# Patient Record
Sex: MALE | Race: WHITE | NOT HISPANIC OR LATINO | Employment: OTHER | URBAN - METROPOLITAN AREA
[De-identification: names, ages, dates, MRNs, and addresses within clinical notes are randomized per-mention and may not be internally consistent; named-entity substitution may affect disease eponyms.]

---

## 2017-06-12 RX ORDER — IBUPROFEN 200 MG
200 TABLET ORAL EVERY 6 HOURS PRN
COMMUNITY

## 2017-06-26 ENCOUNTER — OFFICE VISIT (OUTPATIENT)
Dept: ORTHOPEDIC SURGERY | Facility: CLINIC | Age: 52
End: 2017-06-26

## 2017-06-26 VITALS
HEART RATE: 87 BPM | WEIGHT: 194 LBS | SYSTOLIC BLOOD PRESSURE: 140 MMHG | DIASTOLIC BLOOD PRESSURE: 86 MMHG | HEIGHT: 67 IN | BODY MASS INDEX: 30.45 KG/M2

## 2017-06-26 DIAGNOSIS — M19.171 POST-TRAUMATIC ARTHRITIS OF RIGHT ANKLE: Primary | ICD-10-CM

## 2017-06-26 PROCEDURE — 99213 OFFICE O/P EST LOW 20 MIN: CPT | Performed by: ORTHOPAEDIC SURGERY

## 2017-06-26 NOTE — PROGRESS NOTES
NEW PATIENT    Patient: Anthony Laery  : 1965    Primary Care Provider: Tamanna Young MD    Requesting Provider: As above    Follow-up of the Right Foot (2010 ORIF [facility] TypeIV-C  Calcaneus fx)      History    Chief Complaint: Right calcaneus fracture    History of Present Illness: He is now 7 years out from right calcaneus fracture.  He still uses a cane intermittently, has good days and bad days.  Is not entirely certain he is ready for fusion, but he has been thinking about it.  He has a new grandson.    Current Outpatient Prescriptions on File Prior to Visit   Medication Sig Dispense Refill   • ibuprofen (ADVIL,MOTRIN) 200 MG tablet Take 200 mg by mouth Every 6 (Six) Hours As Needed for Mild Pain (1-3).       No current facility-administered medications on file prior to visit.       No Known Allergies   Past Medical History:   Diagnosis Date   • Traumatic arthritis of ankle     Ankle/Foot     Past Surgical History:   Procedure Laterality Date   • FOOT SURGERY Right    • FRACTURE SURGERY       Family History   Problem Relation Age of Onset   • Hypertension Mother    • Cancer Father       Social History     Social History   • Marital status:      Spouse name: N/A   • Number of children: N/A   • Years of education: N/A     Occupational History   • Not on file.     Social History Main Topics   • Smoking status: Former Smoker     Packs/day: 1.00   • Smokeless tobacco: Current User     Types: Chew   • Alcohol use Yes      Comment: 2 daily   • Drug use: No   • Sexual activity: Defer     Other Topics Concern   • Not on file     Social History Narrative        Review of Systems   Constitutional: Negative.    HENT: Negative.    Eyes: Positive for itching.   Respiratory: Negative.    Cardiovascular: Negative.    Gastrointestinal: Negative.    Endocrine: Negative.    Genitourinary: Negative.    Musculoskeletal: Positive for joint swelling.   Skin: Negative.    Allergic/Immunologic: Positive for  "environmental allergies.   Neurological: Negative.    Hematological: Negative.    Psychiatric/Behavioral: Negative.        The following portions of the patient's history were reviewed and updated as appropriate: allergies, current medications, past family history, past medical history, past social history, past surgical history and problem list.    Physical Exam:   /86  Pulse 87  Ht 67\" (170.2 cm)  Wt 194 lb (88 kg)  BMI 30.38 kg/m2  GENERAL: Body habitus: overweight    Lower extremity edema: Left: trace; Right: trace    Varicose veins:  Left: mild; Right: mild    Gait: antalgic and using cane     Mental Status:  awake and alert; oriented to person, place, and time    Voice:  clear  SKIN:  Normal    Hair Growth:  Right:normal; Left:  normal  NAILS: Toenails: thick  HEENT: Head: Normocephalic, atraumatic,  without obvious abnormality.  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  We again discussed the pros and cons of subtalar fusion.  He has not entirely certain he is ready for this.  I will see him again in a year or sooner with any problems                  "

## 2018-06-25 ENCOUNTER — OFFICE VISIT (OUTPATIENT)
Dept: ORTHOPEDIC SURGERY | Facility: CLINIC | Age: 53
End: 2018-06-25

## 2018-06-25 VITALS — HEART RATE: 62 BPM | BODY MASS INDEX: 28.57 KG/M2 | HEIGHT: 68 IN | WEIGHT: 188.49 LBS | OXYGEN SATURATION: 96 %

## 2018-06-25 DIAGNOSIS — M79.671 RIGHT FOOT PAIN: Primary | ICD-10-CM

## 2018-06-25 DIAGNOSIS — M19.171 POST-TRAUMATIC ARTHRITIS OF RIGHT ANKLE: ICD-10-CM

## 2018-06-25 PROCEDURE — 99213 OFFICE O/P EST LOW 20 MIN: CPT | Performed by: ORTHOPAEDIC SURGERY

## 2018-06-25 NOTE — PROGRESS NOTES
ESTABLISHED PATIENT    Patient: Anthony Leary  : 1965    Primary Care Provider: Tamanna Young MD    Requesting Provider: As above    Follow-up of the Right Foot (1 year f/u/Post-traumatic arthritis of right ankle)      History    Chief Complaint: right subtalar pain    History of Present Illness: he is now 8 years s/p ORIF right calcaneus.  He reports he might think about having a subtalar fusion this year.  His father is having chemotherapy and he wants to see how he does.  They have several more grandchildren now, his wife had a hip replacement.      Current Outpatient Prescriptions on File Prior to Visit   Medication Sig Dispense Refill   • ibuprofen (ADVIL,MOTRIN) 200 MG tablet Take 200 mg by mouth Every 6 (Six) Hours As Needed for Mild Pain (1-3).       No current facility-administered medications on file prior to visit.       No Known Allergies   Past Medical History:   Diagnosis Date   • Traumatic arthritis of ankle     Ankle/Foot     Past Surgical History:   Procedure Laterality Date   • FOOT SURGERY Right    • FRACTURE SURGERY       Family History   Problem Relation Age of Onset   • Hypertension Mother    • Cancer Father       Social History     Social History   • Marital status:      Spouse name: N/A   • Number of children: N/A   • Years of education: N/A     Occupational History   • Not on file.     Social History Main Topics   • Smoking status: Former Smoker     Packs/day: 1.00   • Smokeless tobacco: Current User     Types: Chew   • Alcohol use Yes      Comment: 2 daily   • Drug use: No   • Sexual activity: Defer     Other Topics Concern   • Not on file     Social History Narrative   • No narrative on file        Review of Systems    The following portions of the patient's history were reviewed and updated as appropriate: allergies, current medications, past family history, past medical history, past social history, past surgical history and problem list.    Physical Exam:   Pulse 62    "Ht 172.7 cm (68\")   Wt 85.5 kg (188 lb 7.9 oz)   SpO2 96%   BMI 28.66 kg/m²   GENERAL: Body habitus: overweight    Lower extremity edema: Left: trace; Right: trace    Varicose veins:  Left: mild; Right: mild    Gait: using cane     Mental Status:  awake and alert; oriented to person, place, and time    Voice:  clear    CV:  Dorsalis Pedis:  Right:  2+; Left:  2+    Posterior Tibial: Right:  2+; Left:  2+    Capillary Refill:  Brisk  MSK:      Foot:  Right:  tender over subtalar joint, stiff, old incision healed; Left:  non tender      Medical Decision Making    Data Review:   ordered and reviewed x-rays today    Assessment/Plan/Diagnosis/Treatment Options:   1.Post-traumatic arthritis of right subtalar joint  His post traumatic arthritis pain is getting worse.  We discussed fusion.  We will make his follow up for one year.  If he decides he wants to have surgery before that he will call and come in for an appointment to discuss again.  Xray of calcaneus next visit.                       "

## 2019-06-24 ENCOUNTER — OFFICE VISIT (OUTPATIENT)
Dept: ORTHOPEDIC SURGERY | Facility: CLINIC | Age: 54
End: 2019-06-24

## 2019-06-24 VITALS — HEART RATE: 96 BPM | WEIGHT: 188.49 LBS | HEIGHT: 68 IN | OXYGEN SATURATION: 98 % | BODY MASS INDEX: 28.57 KG/M2

## 2019-06-24 DIAGNOSIS — M19.171 POST-TRAUMATIC ARTHRITIS OF RIGHT ANKLE: Primary | ICD-10-CM

## 2019-06-24 PROCEDURE — 99212 OFFICE O/P EST SF 10 MIN: CPT | Performed by: ORTHOPAEDIC SURGERY

## 2019-06-24 NOTE — PROGRESS NOTES
"ESTABLISHED PATIENT    Patient: Anthony Leary  : 1965    Primary Care Provider: Tamanna Young MD    Requesting Provider: As above    Follow-up of the Right Foot (1 year - Post-traumatic arthritis of right subtalar joint - Discuss SX options )      History    Chief Complaint: Right subtalar joint posttraumatic arthritis    History of Present Illness: He returns for follow-up of his right subtalar posttraumatic arthritis.  He reports that he is \"getting close\" to thinking about surgery.  What I would do surgically is fusing the subtalar joint, and repairing the peroneal tendon subluxation and deepening the fibular groove.  He still using a cane.  He reports significant pain daily.  He is now 9 years post fracture open reduction internal fixation.  His wife is with him today.  Together they are taking care of 5 grandchildren.    Current Outpatient Medications on File Prior to Visit   Medication Sig Dispense Refill   • ibuprofen (ADVIL,MOTRIN) 200 MG tablet Take 200 mg by mouth Every 6 (Six) Hours As Needed for Mild Pain (1-3).       No current facility-administered medications on file prior to visit.       No Known Allergies   Past Medical History:   Diagnosis Date   • Traumatic arthritis of ankle     Ankle/Foot     Past Surgical History:   Procedure Laterality Date   • FOOT SURGERY Right    • FRACTURE SURGERY       Family History   Problem Relation Age of Onset   • Hypertension Mother    • Cancer Father       Social History     Socioeconomic History   • Marital status:      Spouse name: Not on file   • Number of children: Not on file   • Years of education: Not on file   • Highest education level: Not on file   Tobacco Use   • Smoking status: Former Smoker     Packs/day: 1.00   • Smokeless tobacco: Current User     Types: Chew   Substance and Sexual Activity   • Alcohol use: Yes     Comment: 2 daily   • Drug use: No   • Sexual activity: Defer        Review of Systems   Constitutional: Negative.  " "  HENT: Positive for sneezing.    Eyes: Positive for redness and itching.   Respiratory: Negative.    Cardiovascular: Positive for leg swelling.   Gastrointestinal: Negative.    Endocrine: Negative.    Genitourinary: Negative.    Musculoskeletal: Positive for arthralgias and joint swelling.   Skin: Negative.    Allergic/Immunologic: Negative.    Neurological: Negative.    Hematological: Negative.    Psychiatric/Behavioral: Negative.        The following portions of the patient's history were reviewed and updated as appropriate: allergies, current medications, past family history, past medical history, past social history, past surgical history and problem list.    Physical Exam:   Pulse 96   Ht 172.7 cm (68\")   Wt 85.5 kg (188 lb 7.9 oz)   SpO2 98%   BMI 28.66 kg/m²   GENERAL: Body habitus: overweight    Lower extremity edema: Left: trace; Right: trace    Gait: antalgic and using cane     Mental Status:  awake and alert; oriented to person, place, and time  MSK:  Tibia:  Right:  non tender; Left:  non tender        Ankle:  Right: Tender over the peroneal tendons which are subluxed over the fibula; Left:  non tender        Foot:  Right:  Very tender over the subtalar joint which is stiff, old incision is healed; Left:  non tender    NEURO Sensation:  intact     Medical Decision Making    Data Review:   ordered and reviewed x-rays today    Assessment/Plan/Diagnosis/Treatment Options:   1. Post-traumatic arthritis of right ankle  He has posttraumatic arthritis of the right subtalar joint.  He is also developed subluxation of the peroneal tendons over the years.  We again discussed treatment.  I will see him back in October.  Standing 2 views of the foot and AP of the ankle.  - XR Calcaneus 2+ View Right                            "

## 2019-10-07 ENCOUNTER — OFFICE VISIT (OUTPATIENT)
Dept: ORTHOPEDIC SURGERY | Facility: CLINIC | Age: 54
End: 2019-10-07

## 2019-10-07 VITALS — HEART RATE: 108 BPM | OXYGEN SATURATION: 98 % | WEIGHT: 187.83 LBS | BODY MASS INDEX: 28.47 KG/M2 | HEIGHT: 68 IN

## 2019-10-07 DIAGNOSIS — M19.171 POST-TRAUMATIC ARTHRITIS OF RIGHT ANKLE: Primary | ICD-10-CM

## 2019-10-07 PROCEDURE — 99214 OFFICE O/P EST MOD 30 MIN: CPT | Performed by: ORTHOPAEDIC SURGERY

## 2019-10-07 NOTE — PROGRESS NOTES
ESTABLISHED PATIENT    Patient: Anthony Leary  : 1965    Primary Care Provider: Tamanna Young MD    Requesting Provider: As above    Follow-up (Post-traumatic arthritis of right ankle)      History    Chief Complaint: Right hindfoot pain    History of Present Illness: He returns for follow-up of his right subtalar posttraumatic arthritis with peroneal dislocation.  He would like to proceed with surgery in May.  I went over everything in detail with the patient and his wife.    Current Outpatient Medications on File Prior to Visit   Medication Sig Dispense Refill   • ibuprofen (ADVIL,MOTRIN) 200 MG tablet Take 200 mg by mouth Every 6 (Six) Hours As Needed for Mild Pain (1-3).       No current facility-administered medications on file prior to visit.       No Known Allergies   Past Medical History:   Diagnosis Date   • Traumatic arthritis of ankle     Ankle/Foot     Past Surgical History:   Procedure Laterality Date   • FOOT SURGERY Right    • FRACTURE SURGERY       Family History   Problem Relation Age of Onset   • Hypertension Mother    • Cancer Father       Social History     Socioeconomic History   • Marital status:      Spouse name: Not on file   • Number of children: Not on file   • Years of education: Not on file   • Highest education level: Not on file   Tobacco Use   • Smoking status: Former Smoker     Packs/day: 1.00   • Smokeless tobacco: Current User     Types: Chew   Substance and Sexual Activity   • Alcohol use: Yes     Comment: 2 daily   • Drug use: No   • Sexual activity: Defer        Review of Systems   Constitutional: Negative.    HENT: Negative.    Eyes: Negative.    Respiratory: Negative.    Cardiovascular: Negative.    Gastrointestinal: Negative.    Endocrine: Negative.    Genitourinary: Negative.    Musculoskeletal: Positive for joint swelling.   Skin: Negative.    Allergic/Immunologic: Negative.    Neurological: Negative.    Hematological: Negative.    Psychiatric/Behavioral:  "Negative.        The following portions of the patient's history were reviewed and updated as appropriate: allergies, current medications, past family history, past medical history, past social history, past surgical history and problem list.    Physical Exam:   Pulse 108   Ht 172.7 cm (67.99\")   Wt 85.2 kg (187 lb 13.3 oz)   SpO2 98%   BMI 28.57 kg/m²   GENERAL: Body habitus: normal weight for height    Lower extremity edema: Left: trace; Right: trace    Gait: using cane     Mental Status:  awake and alert; oriented to person, place, and time  MSK:  Tibia:  Right:  non tender; Left:  non tender        Ankle:  Right: Tender over the peroneals, they are subluxed over the fibula; Left:  non tender        Foot:  Right:  Very tender over the subtalar joint, stiff; Left:  non tender    NEURO Sensation:  intact    Medical Decision Making    Data Review:   ordered and reviewed x-rays today    Assessment/Plan/Diagnosis/Treatment Options:   1. Post-traumatic arthritis of right ankle  He has subtalar arthritis and dislocation of the peroneal tendons.  He has a little bit of irregularity of the calcaneocuboid joint but is not symptomatic there.  We discussed subtalar fusion with iliac crest bone graft.  I will also repair the peroneal tendons and deep in the peroneal groove.  He will be nonweightbearing 6 to 8 weeks then walking boot 6 to 8 weeks.  I explained it really takes a year to see the and results.  He will always have some swelling, he will always have trouble on uneven ground.   We discussed the risks including but not limited to: death, infection, neurovascular damage, strokes, heart attacks, blood clots, chronic pain, deformity, stiffness, malunion, nonunion, hardware failure, need for further surgery,  amputation,numbness in the thigh, hernia, pelvic fracture, etc. Questions were asked and answered in detail.     - XR Foot 2 View Right  - XR Ankle 2 View Right                            "

## 2020-01-22 ENCOUNTER — TELEPHONE (OUTPATIENT)
Dept: ORTHOPEDIC SURGERY | Facility: CLINIC | Age: 55
End: 2020-01-22

## 2021-12-09 ENCOUNTER — TELEPHONE (OUTPATIENT)
Dept: SURGERY | Facility: CLINIC | Age: 56
End: 2021-12-09

## 2021-12-09 DIAGNOSIS — Z01.818 PREOPERATIVE CLEARANCE: Primary | ICD-10-CM

## 2021-12-14 ENCOUNTER — PREP FOR SURGERY (OUTPATIENT)
Dept: OTHER | Facility: HOSPITAL | Age: 56
End: 2021-12-14

## 2021-12-14 DIAGNOSIS — Z12.11 ENCOUNTER FOR SCREENING COLONOSCOPY: Primary | ICD-10-CM

## 2021-12-14 RX ORDER — BISACODYL 5 MG
TABLET, DELAYED RELEASE (ENTERIC COATED) ORAL
Qty: 4 TABLET | Refills: 0 | Status: SHIPPED | OUTPATIENT
Start: 2021-12-14

## 2021-12-14 RX ORDER — POLYETHYLENE GLYCOL 3350 17 G/17G
238 POWDER, FOR SOLUTION ORAL ONCE
Qty: 17 PACKET | Refills: 0 | Status: SHIPPED | OUTPATIENT
Start: 2021-12-14 | End: 2021-12-14

## 2021-12-14 NOTE — TELEPHONE ENCOUNTER
PRESCREENING FOR OPEN ACCESS SCHEDULING    Anthony Leary, 1965  [id number]    12/14/21    If, the patient answers yes to any of the following questions the provider will be informed prior to scheduling open access for approval and documented in the chart.    []  Yes  [x] No    1. Have you ever had a colonoscopy in the past?      When:        Where:       Polyps or other:     []  Yes  [x] No    2. Family history of colon cancer?      Relation:       Age of onset:       Do you currently have any of the following?    []  Yes  [x] No  Rectal bleeding, if so, how long?     []  Yes  [x] No  Abdominal pain, if so, how long?    []  Yes  [x] No  Constipation, if so, how long?    []  Yes  [x] No  Diarrhea, if so, how long?    []  Yes  [x] No  Weight loss, is so, how much?    [] Yes  [x] No  Small caliber stool, if so, how long?      Have you ever had any of the following conditions?    [] Yes  [x] No  Heart attack?      When?       Last cardiac workup?     Blood thinners?    [] Yes  [x] No   Lung problems, asthma or COPD?  [] Yes  [x] No  Oxygen required?       [] Yes  [x] No  Stroke?     [] Yes  [x] No  Have you ever had a reaction to anesthesia?

## 2022-01-10 ENCOUNTER — OUTSIDE FACILITY SERVICE (OUTPATIENT)
Dept: SURGERY | Facility: CLINIC | Age: 57
End: 2022-01-10

## 2022-01-10 PROCEDURE — G0121 COLON CA SCRN NOT HI RSK IND: HCPCS | Performed by: SURGERY

## 2025-05-19 ENCOUNTER — OFFICE VISIT (OUTPATIENT)
Dept: SURGERY | Facility: CLINIC | Age: 60
End: 2025-05-19
Payer: MEDICARE

## 2025-05-19 VITALS
WEIGHT: 196.8 LBS | HEIGHT: 68 IN | SYSTOLIC BLOOD PRESSURE: 150 MMHG | HEART RATE: 76 BPM | BODY MASS INDEX: 29.83 KG/M2 | DIASTOLIC BLOOD PRESSURE: 89 MMHG | OXYGEN SATURATION: 95 %

## 2025-05-19 DIAGNOSIS — R13.19 ESOPHAGEAL DYSPHAGIA: Primary | ICD-10-CM

## 2025-05-19 PROCEDURE — 1160F RVW MEDS BY RX/DR IN RCRD: CPT | Performed by: SURGERY

## 2025-05-19 PROCEDURE — 99204 OFFICE O/P NEW MOD 45 MIN: CPT | Performed by: SURGERY

## 2025-05-19 PROCEDURE — 1159F MED LIST DOCD IN RCRD: CPT | Performed by: SURGERY

## 2025-05-30 ENCOUNTER — TELEPHONE (OUTPATIENT)
Dept: SURGERY | Facility: CLINIC | Age: 60
End: 2025-05-30
Payer: MEDICARE

## 2025-07-21 ENCOUNTER — OUTSIDE FACILITY SERVICE (OUTPATIENT)
Dept: SURGERY | Facility: CLINIC | Age: 60
End: 2025-07-21
Payer: MEDICARE

## 2025-08-04 ENCOUNTER — OFFICE VISIT (OUTPATIENT)
Dept: SURGERY | Facility: CLINIC | Age: 60
End: 2025-08-04
Payer: MEDICARE

## 2025-08-04 VITALS
BODY MASS INDEX: 29.7 KG/M2 | DIASTOLIC BLOOD PRESSURE: 75 MMHG | HEART RATE: 72 BPM | SYSTOLIC BLOOD PRESSURE: 112 MMHG | HEIGHT: 68 IN | WEIGHT: 196 LBS | OXYGEN SATURATION: 96 %

## 2025-08-04 DIAGNOSIS — R13.19 ESOPHAGEAL DYSPHAGIA: Primary | ICD-10-CM

## 2025-08-04 PROCEDURE — 99213 OFFICE O/P EST LOW 20 MIN: CPT | Performed by: SURGERY

## 2025-08-04 PROCEDURE — 1159F MED LIST DOCD IN RCRD: CPT | Performed by: SURGERY

## 2025-08-04 PROCEDURE — 1160F RVW MEDS BY RX/DR IN RCRD: CPT | Performed by: SURGERY
